# Patient Record
Sex: FEMALE | Race: OTHER | Employment: STUDENT | URBAN - METROPOLITAN AREA
[De-identification: names, ages, dates, MRNs, and addresses within clinical notes are randomized per-mention and may not be internally consistent; named-entity substitution may affect disease eponyms.]

---
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
## 2021-01-23 RX ORDER — NORETHINDRONE ACETATE AND ETHINYL ESTRADIOL AND FERROUS FUMARATE 1MG-20(24)
1 KIT ORAL DAILY
Qty: 84 TABLET | Refills: 0 | OUTPATIENT
Start: 2021-01-23

## 2021-01-27 ENCOUNTER — TELEPHONE (OUTPATIENT)
Dept: OBGYN CLINIC | Facility: CLINIC | Age: 21
End: 2021-01-27

## 2021-01-27 NOTE — TELEPHONE ENCOUNTER
Father called all angry right off the bat asking why his daughters prescription cannot be filled. Was cussing and screaming and tried to help get information but father being very angry and hateful. Not sure what is going on but, daughter is 24 yrs of age and I dont know if the insurance is denying it or what.      Please advise

## 2021-01-27 NOTE — TELEPHONE ENCOUNTER
Called Pharmacy. Pt was given 12 month rx of junel in July 2020. Per Katerina Valenzuela at [facility], pt only picked up 2 packs, the last being in August 2020. They are currently out of Junel but they are dispensing Aurovela Fe 24 as a substitute. Called pt and LMTCB. Has she not been taking any OCPs since august? Was she filling this rx somewhere else? Due to pt's age and the language her father used with phone room, I will not be calling him. Will wait for pt to call back.

## 2021-05-14 ENCOUNTER — APPOINTMENT (OUTPATIENT)
Dept: GENERAL RADIOLOGY | Age: 21
End: 2021-05-14
Attending: NURSE PRACTITIONER
Payer: COMMERCIAL

## 2021-05-14 ENCOUNTER — NURSE TRIAGE (OUTPATIENT)
Dept: INTERNAL MEDICINE CLINIC | Facility: CLINIC | Age: 21
End: 2021-05-14

## 2021-05-14 ENCOUNTER — HOSPITAL ENCOUNTER (OUTPATIENT)
Age: 21
Discharge: HOME | End: 2021-05-14
Payer: COMMERCIAL

## 2021-05-14 VITALS
BODY MASS INDEX: 29.23 KG/M2 | WEIGHT: 165 LBS | RESPIRATION RATE: 16 BRPM | HEART RATE: 98 BPM | TEMPERATURE: 98 F | SYSTOLIC BLOOD PRESSURE: 125 MMHG | DIASTOLIC BLOOD PRESSURE: 92 MMHG | OXYGEN SATURATION: 98 % | HEIGHT: 63 IN

## 2021-05-14 DIAGNOSIS — S92.535A CLOSED NONDISPLACED FRACTURE OF DISTAL PHALANX OF LESSER TOE OF LEFT FOOT, INITIAL ENCOUNTER: Primary | ICD-10-CM

## 2021-05-14 PROCEDURE — L3260 AMBULATORY SURGICAL BOOT EAC: HCPCS | Performed by: NURSE PRACTITIONER

## 2021-05-14 PROCEDURE — 29550 STRAPPING OF TOES: CPT | Performed by: NURSE PRACTITIONER

## 2021-05-14 PROCEDURE — 99203 OFFICE O/P NEW LOW 30 MIN: CPT | Performed by: NURSE PRACTITIONER

## 2021-05-14 PROCEDURE — 73630 X-RAY EXAM OF FOOT: CPT | Performed by: NURSE PRACTITIONER

## 2021-05-14 NOTE — ED PROVIDER NOTES
Patient Seen in: [facility]      History   Patient presents with:  Leg or Foot Injury    Stated Complaint: LEFT TOE INJURY    HPI/Subjective:   HPI    This is a well-appearing 26year-old who presents with left foot injury.   Today she was rem Mouth: Mucous membranes are moist.      Pharynx: Oropharynx is clear. Uvula midline. Eyes:      General: Lids are normal.      Extraocular Movements: Extraocular movements intact.       Conjunctiva/sclera: Conjunctivae normal.      Pupils: Pupils are eq into a joint space. No dislocation. Dictated by (CST): Wallace Jeans, MD on 5/14/2021 at 6:02 PM     Finalized by (CST): Wallace Jeans, MD on 5/14/2021 at 6:04 PM            MDM      We did discuss supportive care and close follow-up.   [place] to [place]

## 2021-05-14 NOTE — ED QUICK NOTES
Lane taped toe ice elevate post op shoe for comfort. Copy of xrays provided. Motrin or tylenol for pain. Call and follow up with pcp in office for follow up.

## 2021-05-14 NOTE — TELEPHONE ENCOUNTER
Action Requested: Summary for Provider     []  Critical Lab, Recommendations Needed  [] Need Additional Advice  [x]   FYI    []   Need Orders  [] Need Medications Sent to Pharmacy  []  Other     SUMMARY: 3 hours ago patient was removing the door from a brooks

## 2021-06-16 ENCOUNTER — MED REC SCAN ONLY (OUTPATIENT)
Dept: INTERNAL MEDICINE CLINIC | Facility: CLINIC | Age: 21
End: 2021-06-16

## 2021-07-27 ENCOUNTER — TELEPHONE (OUTPATIENT)
Dept: OBGYN CLINIC | Facility: CLINIC | Age: 21
End: 2021-07-27

## 2021-07-27 NOTE — TELEPHONE ENCOUNTER
Received a fax for a prescription form from [facility] for birth control for Junel FE 24. Pt had her annual 7/2020 with Cale Jimenez. No new appt is scheduled for her annual.  Sent form back asking for pt to call the office.  She will need to schedule an annual.

## 2021-07-30 ENCOUNTER — TELEPHONE (OUTPATIENT)
Dept: OBGYN CLINIC | Facility: CLINIC | Age: 21
End: 2021-07-30

## 2021-07-30 NOTE — TELEPHONE ENCOUNTER
Received fax from [facility] for pts OCP Junel FE 24 Tabs 28s. Pts last annual 7/2020 with MILADY. No upcoming appt scheduled of annual.     TE on 1/27/2021 indicates pt may not have taken OCPs since August of last year.  Sent back request denied and requestin

## 2021-08-02 NOTE — TELEPHONE ENCOUNTER
Patient returning call. [facility] [id number] [facility]  It is ok to leave a detailed message.

## 2021-08-02 NOTE — TELEPHONE ENCOUNTER
Spoke with pt. She states she was getting her rx through mail order. She ran out of pills on 5/2021 and never called for refills. Pt did schedule an annual exam on 8/19/2021 with [facility]. Pt was hoping to go back on ocps.   States her LMP was 6/29/21 so she

## 2021-08-02 NOTE — TELEPHONE ENCOUNTER
Pt father  Was very argumentative  Informed he needs appt Father  Natalie Amado up saying We ar a bunch of Bitches contacted the nurse ,

## 2021-08-02 NOTE — TELEPHONE ENCOUNTER
Pt has made appt for b/c consult on 8/19. Pt would like 1-mo of b/c sent to [facility] in [facility] to start back on pill prior to appt. Please advise.

## 2021-08-02 NOTE — TELEPHONE ENCOUNTER
LMTCB for pt. Due to pt's age and father's attitude and language with the phone room, I will no be calling him back. Will wait for pt to return call to discuss issue with ocps. Last annual - 7/7/2020  No future appt made.     Phone encounter on 1/27/

## 2021-08-19 ENCOUNTER — OFFICE VISIT (OUTPATIENT)
Dept: OBGYN CLINIC | Facility: CLINIC | Age: 21
End: 2021-08-19
Payer: COMMERCIAL

## 2021-08-19 VITALS
HEIGHT: 63 IN | BODY MASS INDEX: 31.71 KG/M2 | HEART RATE: 91 BPM | DIASTOLIC BLOOD PRESSURE: 77 MMHG | WEIGHT: 179 LBS | SYSTOLIC BLOOD PRESSURE: 117 MMHG

## 2021-08-19 DIAGNOSIS — Z00.00 WELL WOMAN EXAM WITHOUT GYNECOLOGICAL EXAM: Primary | ICD-10-CM

## 2021-08-19 DIAGNOSIS — Z76.0 MEDICATION REFILL: ICD-10-CM

## 2021-08-19 PROCEDURE — 3074F SYST BP LT 130 MM HG: CPT | Performed by: CLINICAL NURSE SPECIALIST

## 2021-08-19 PROCEDURE — 3078F DIAST BP <80 MM HG: CPT | Performed by: CLINICAL NURSE SPECIALIST

## 2021-08-19 PROCEDURE — 3008F BODY MASS INDEX DOCD: CPT | Performed by: CLINICAL NURSE SPECIALIST

## 2021-08-19 PROCEDURE — 99395 PREV VISIT EST AGE 18-39: CPT | Performed by: CLINICAL NURSE SPECIALIST

## 2021-08-19 RX ORDER — NORETHINDRONE ACETATE AND ETHINYL ESTRADIOL AND FERROUS FUMARATE 1MG-20(24)
1 KIT ORAL DAILY
Qty: 84 TABLET | Refills: 4 | Status: SHIPPED | OUTPATIENT
Start: 2021-08-19 | End: 2021-12-31

## 2021-08-21 NOTE — PROGRESS NOTES
Lydia De La Fuente is a 22year old female [facility] Patient's last menstrual period was 06/17/2021. Patient presents with:  Gyn Exam: ANNUAL EXAM  Medication Request: OCP  Last annual exam was last year with [address] No Pap history due to age.  She was started on Stress :   Social Connections:       Frequency of Communication with Friends and Family:       Frequency of Social Gatherings with Friends and Family:       Attends Denominational Services:       Active Member of Clubs or Organizations:       Attends Club or Or noted  Breast: normal without palpable masses, tenderness, asymmetry, nipple discharge, nipple retraction or skin changes  Abdomen:  soft, nontender, nondistended, no masses  Skin/Hair: no unusual rashes or bruises  Extremities: no edema, no cyanosis  Psyc

## 2021-09-12 NOTE — TELEPHONE ENCOUNTER
Called [facility] and confirmed Rx sent in today of Blisovi 24 Fe 1-20 mg-mcg. Pharmacist states he will process it as TERRY so that Sirena Syed does not automatically populate.  Pharmacist states he will hold rx until pt calls them with insuran
Dad is calling back to speak with nurse Randa Leonard. Please advise.
Father would like to speak to vikas about daughters medication. Father hoping within the hr as he needs to call [facility]. Please advise.
Father would like to speak to vikas about pt's medication.
Juliette Smith states his daughter did not let him know she was running out of pills so he had to pay 120 dollars out of pocket again for the OCPs. He is asking if the rx was sent to mail order pharmacy yet to avoid this from happening again.  Informed Juliette Smith
Pt's father wanted to make sure he received the right med from mail order. Med confirmed.
Routed to peds in error. Message sent to OB/GYNE for follow up.
Spoke to Jean Pierre again. He states the pharmacy has all the insurance info and the rx should be sent out this week. He has no further questions.
[facility] is calling about the birth control ,  Clarify the [facility] strength ,   blisovi 24 se   This is what Pt is on ,
unknown

## 2021-12-31 ENCOUNTER — TELEPHONE (OUTPATIENT)
Dept: OBGYN CLINIC | Facility: CLINIC | Age: 21
End: 2021-12-31

## 2021-12-31 DIAGNOSIS — Z76.0 MEDICATION REFILL: ICD-10-CM

## 2021-12-31 RX ORDER — NORETHINDRONE ACETATE AND ETHINYL ESTRADIOL AND FERROUS FUMARATE 1MG-20(24)
1 KIT ORAL DAILY
Qty: 84 TABLET | Refills: 2 | Status: SHIPPED | OUTPATIENT
Start: 2021-12-31

## 2021-12-31 NOTE — TELEPHONE ENCOUNTER
Called pts pharmacy to confirm they have pts Junel fe on file. Spoke to Sosa who stated that they have pts RX however pts insurance is no longer covering RXs through [facility] and needs to be [facility].  Sosa stated that if

## 2021-12-31 NOTE — TELEPHONE ENCOUNTER
Current Outpatient Medications   Medication Sig Dispense Refill   • JUNEL FE 24 1-20 MG-MCG(24) Oral Tab Take 1 tablet by mouth daily.  84 tablet 4     [facility]  [phone] pt only has 2 pills left ,  Send 1 pack to [facility] on file in . Nancy Zuñiga

## 2024-11-15 NOTE — TELEPHONE ENCOUNTER
Federica is scheduled for next Tuesday 11/19.   PER PT CALLING TO CONFIRMED THAT SHE HAD THE MDC VACCINE / IF SO / SHE WOULD LIKE TO HAVE THAT FAXED TO HER SCHOOL / FAX # [phone] / RAYA ADV

## 2025-09-24 ENCOUNTER — APPOINTMENT (OUTPATIENT)
Dept: OBGYN | Age: 25
End: 2025-09-24

## (undated) NOTE — LETTER
VACCINE ADMINISTRATION RECORD  PARENT / GUARDIAN APPROVAL  Date: 10/13/2017  Vaccine administered to: Creola Hodgkin     : 2000    MRN: [id number]    A copy of the appropriate Centers for Disease Control and Prevention Vaccine Information statem

## (undated) NOTE — MR AVS SNAPSHOT
Erin  [address]  [phone]               Thank you for choosing us for your health care visit with Nurse.   We are glad to serve you and happy to provide you with this summary of your vis o Be role models themselves by making healthy eating and daily physical activity the norm for their family.   o Create a home where healthy choices are available and encouraged  o Make it fun – find ways to engage your children such as:  o playing a game of

## (undated) NOTE — LETTER
VACCINE ADMINISTRATION RECORD  PARENT / GUARDIAN APPROVAL  Date: 2019  Vaccine administered to: Warden Hebert     : 2000    MRN: [id number]    A copy of the appropriate Centers for Disease Control and Prevention Vaccine Information stateme

## (undated) NOTE — Clinical Note
VACCINE ADMINISTRATION RECORD  PARENT / GUARDIAN APPROVAL  Date: 2/3/2017  Vaccine administered to: Mayra Kowalski     : 2000    MRN: [id number]    A copy of the appropriate Centers for Disease Control and Prevention Vaccine Information statemen

## (undated) NOTE — MR AVS SNAPSHOT
Erin  [address]  [phone]               Thank you for choosing us for your health care visit with Albertina Henson MD.  We are glad to serve you and happy to provide you with this summar often. Sometimes toddlers need to try a food 10 times before they actually accept and enjoy it. It is also important to encourage play time as soon as they start crawling and walking.  As your children grow, continue to help them live a healthy active lifes

## (undated) NOTE — LETTER
10/13/2017              Bebo Claudiokev         To Whom It May Concern:    Please excuse Bebo Garcia from leaving school early due to her appointment with [facility].  If you have an

## (undated) NOTE — LETTER
[facility] [employer] of Child Health Examination       Student's Name  Beck Pritchett Signature                                                                                                                                   Title          DO                 Date   6/27/2019   Signature 5/18/2000  Sex  Female School   Grade Level/ID#  College   HEALTH HISTORY          TO BE COMPLETED AND SIGNED BY PARENT/GUARDIAN AND VERIFIED BY HEALTH CARE PROVIDER    ALLERGIES  (Food, drug, insect, other)  Patient has no known allergies.  MEDICATION  Tahira Bermudez PHYSICAL EXAMINATION REQUIREMENTS    Entire section below to be completed by MD/DO/APN/PA       PHYSICAL EXAMINATION REQUIREMENTS (head circumference if <33 years old):   /73   Pulse 92   Ht 5' 3\" (1.6 m)   Wt 74 kg (163 lb 3.2 oz)   BMI 28.91 kg/m Mouth/Dental Yes  Spinal examination Yes    Cardiovascular/HTN Yes  Nutritional status Yes    Respiratory Yes                   Diagnosis of Asthma: No Mental Health Yes        Currently Prescribed Asthma Medication:            Quick-relief  medication (e.

## (undated) NOTE — LETTER
8/13/2018              Masoud Davila        [address]       Immunization History   Administered Date(s) Administered   • DTAP 07/18/2001, 11/16/2001, 02/25/2002, 09/13/2002, 05/27/2005   • Flulaval 0.5 ml 6

## (undated) NOTE — LETTER
VACCINE ADMINISTRATION RECORD  PARENT / GUARDIAN APPROVAL  Date: 2019  Vaccine administered to: Arjun Medina     : 2000    MRN: [id number]    A copy of the appropriate Centers for Disease Control and Prevention Vaccine Information stateme